# Patient Record
Sex: FEMALE | Race: BLACK OR AFRICAN AMERICAN | Employment: UNEMPLOYED | URBAN - METROPOLITAN AREA
[De-identification: names, ages, dates, MRNs, and addresses within clinical notes are randomized per-mention and may not be internally consistent; named-entity substitution may affect disease eponyms.]

---

## 2017-04-26 LAB
ANTIBODY SCREEN, EXTERNAL: NEGATIVE
CHLAMYDIA, EXTERNAL: NEGATIVE
HBSAG, EXTERNAL: NEGATIVE
HCT, EXTERNAL: 36.4
HGB, EXTERNAL: 12.5
HIV, EXTERNAL: NEGATIVE
N. GONORRHEA, EXTERNAL: NEGATIVE
RUBELLA, EXTERNAL: NORMAL
TYPE, ABO & RH, EXTERNAL: NORMAL

## 2017-10-20 LAB — GRBS, EXTERNAL: NEGATIVE

## 2017-11-24 ENCOUNTER — ANESTHESIA EVENT (OUTPATIENT)
Dept: LABOR AND DELIVERY | Age: 32
End: 2017-11-24
Payer: OTHER GOVERNMENT

## 2017-11-24 ENCOUNTER — ANESTHESIA (OUTPATIENT)
Dept: LABOR AND DELIVERY | Age: 32
End: 2017-11-24
Payer: OTHER GOVERNMENT

## 2017-11-24 ENCOUNTER — HOSPITAL ENCOUNTER (INPATIENT)
Age: 32
LOS: 2 days | Discharge: HOME | End: 2017-11-26
Attending: STUDENT IN AN ORGANIZED HEALTH CARE EDUCATION/TRAINING PROGRAM | Admitting: STUDENT IN AN ORGANIZED HEALTH CARE EDUCATION/TRAINING PROGRAM
Payer: OTHER GOVERNMENT

## 2017-11-24 PROBLEM — Z34.90 PREGNANCY: Status: ACTIVE | Noted: 2017-11-24

## 2017-11-24 LAB
BASOPHILS # BLD: 0 K/UL (ref 0–0.1)
BASOPHILS NFR BLD: 0 % (ref 0–1)
EOSINOPHIL # BLD: 0.1 K/UL (ref 0–0.4)
EOSINOPHIL NFR BLD: 1 % (ref 0–7)
ERYTHROCYTE [DISTWIDTH] IN BLOOD BY AUTOMATED COUNT: 13.3 % (ref 11.5–14.5)
HCT VFR BLD AUTO: 33.7 % (ref 35–47)
HGB BLD-MCNC: 11.7 G/DL (ref 11.5–16)
LYMPHOCYTES # BLD: 2 K/UL (ref 0.8–3.5)
LYMPHOCYTES NFR BLD: 25 % (ref 12–49)
MCH RBC QN AUTO: 31.4 PG (ref 26–34)
MCHC RBC AUTO-ENTMCNC: 34.7 G/DL (ref 30–36.5)
MCV RBC AUTO: 90.3 FL (ref 80–99)
MONOCYTES # BLD: 0.6 K/UL (ref 0–1)
MONOCYTES NFR BLD: 7 % (ref 5–13)
NEUTS SEG # BLD: 5.4 K/UL (ref 1.8–8)
NEUTS SEG NFR BLD: 67 % (ref 32–75)
PLATELET # BLD AUTO: 202 K/UL (ref 150–400)
RBC # BLD AUTO: 3.73 M/UL (ref 3.8–5.2)
WBC # BLD AUTO: 8 K/UL (ref 3.6–11)

## 2017-11-24 PROCEDURE — 75410000003 HC RECOV DEL/VAG/CSECN EA 0.5 HR: Performed by: STUDENT IN AN ORGANIZED HEALTH CARE EDUCATION/TRAINING PROGRAM

## 2017-11-24 PROCEDURE — 10907ZC DRAINAGE OF AMNIOTIC FLUID, THERAPEUTIC FROM PRODUCTS OF CONCEPTION, VIA NATURAL OR ARTIFICIAL OPENING: ICD-10-PCS | Performed by: STUDENT IN AN ORGANIZED HEALTH CARE EDUCATION/TRAINING PROGRAM

## 2017-11-24 PROCEDURE — 75410000000 HC DELIVERY VAGINAL/SINGLE: Performed by: STUDENT IN AN ORGANIZED HEALTH CARE EDUCATION/TRAINING PROGRAM

## 2017-11-24 PROCEDURE — 74011000250 HC RX REV CODE- 250

## 2017-11-24 PROCEDURE — 77030014125 HC TY EPDRL BBMI -B: Performed by: ANESTHESIOLOGY

## 2017-11-24 PROCEDURE — 76060000078 HC EPIDURAL ANESTHESIA: Performed by: ANESTHESIOLOGY

## 2017-11-24 PROCEDURE — 65270000029 HC RM PRIVATE

## 2017-11-24 PROCEDURE — 77030018749 HC HK AMNIO DISP DERY -A

## 2017-11-24 PROCEDURE — 00HU33Z INSERTION OF INFUSION DEVICE INTO SPINAL CANAL, PERCUTANEOUS APPROACH: ICD-10-PCS | Performed by: ANESTHESIOLOGY

## 2017-11-24 PROCEDURE — 3E0P3VZ INTRODUCTION OF HORMONE INTO FEMALE REPRODUCTIVE, PERCUTANEOUS APPROACH: ICD-10-PCS | Performed by: STUDENT IN AN ORGANIZED HEALTH CARE EDUCATION/TRAINING PROGRAM

## 2017-11-24 PROCEDURE — 85025 COMPLETE CBC W/AUTO DIFF WBC: CPT | Performed by: STUDENT IN AN ORGANIZED HEALTH CARE EDUCATION/TRAINING PROGRAM

## 2017-11-24 PROCEDURE — 74011250636 HC RX REV CODE- 250/636: Performed by: STUDENT IN AN ORGANIZED HEALTH CARE EDUCATION/TRAINING PROGRAM

## 2017-11-24 PROCEDURE — 74011250637 HC RX REV CODE- 250/637: Performed by: STUDENT IN AN ORGANIZED HEALTH CARE EDUCATION/TRAINING PROGRAM

## 2017-11-24 PROCEDURE — 77030034850

## 2017-11-24 PROCEDURE — 75410000002 HC LABOR FEE PER 1 HR: Performed by: STUDENT IN AN ORGANIZED HEALTH CARE EDUCATION/TRAINING PROGRAM

## 2017-11-24 PROCEDURE — 36415 COLL VENOUS BLD VENIPUNCTURE: CPT | Performed by: STUDENT IN AN ORGANIZED HEALTH CARE EDUCATION/TRAINING PROGRAM

## 2017-11-24 PROCEDURE — 74011250636 HC RX REV CODE- 250/636: Performed by: ANESTHESIOLOGY

## 2017-11-24 RX ORDER — HYDROCORTISONE ACETATE PRAMOXINE HCL 2.5; 1 G/100G; G/100G
CREAM TOPICAL AS NEEDED
Status: DISCONTINUED | OUTPATIENT
Start: 2017-11-24 | End: 2017-11-26 | Stop reason: HOSPADM

## 2017-11-24 RX ORDER — FENTANYL/BUPIVACAINE/NS/PF 2-1250MCG
1-16 PREFILLED PUMP RESERVOIR EPIDURAL CONTINUOUS
Status: DISCONTINUED | OUTPATIENT
Start: 2017-11-24 | End: 2017-11-24 | Stop reason: HOSPADM

## 2017-11-24 RX ORDER — NALOXONE HYDROCHLORIDE 0.4 MG/ML
0.4 INJECTION, SOLUTION INTRAMUSCULAR; INTRAVENOUS; SUBCUTANEOUS AS NEEDED
Status: DISCONTINUED | OUTPATIENT
Start: 2017-11-24 | End: 2017-11-26 | Stop reason: HOSPADM

## 2017-11-24 RX ORDER — DOCUSATE SODIUM 100 MG/1
100 CAPSULE, LIQUID FILLED ORAL 2 TIMES DAILY
Status: DISCONTINUED | OUTPATIENT
Start: 2017-11-24 | End: 2017-11-26 | Stop reason: HOSPADM

## 2017-11-24 RX ORDER — ACETAMINOPHEN 325 MG/1
650 TABLET ORAL
Status: DISCONTINUED | OUTPATIENT
Start: 2017-11-24 | End: 2017-11-26 | Stop reason: HOSPADM

## 2017-11-24 RX ORDER — OXYTOCIN/RINGER'S LACTATE 20/1000 ML
125-500 PLASTIC BAG, INJECTION (ML) INTRAVENOUS ONCE
Status: ACTIVE | OUTPATIENT
Start: 2017-11-24 | End: 2017-11-25

## 2017-11-24 RX ORDER — DIPHENHYDRAMINE HYDROCHLORIDE 50 MG/ML
12.5 INJECTION, SOLUTION INTRAMUSCULAR; INTRAVENOUS
Status: DISCONTINUED | OUTPATIENT
Start: 2017-11-24 | End: 2017-11-26 | Stop reason: HOSPADM

## 2017-11-24 RX ORDER — OXYTOCIN IN 5 % DEXTROSE 30/500 ML
1-25 PLASTIC BAG, INJECTION (ML) INTRAVENOUS
Status: DISCONTINUED | OUTPATIENT
Start: 2017-11-24 | End: 2017-11-26 | Stop reason: HOSPADM

## 2017-11-24 RX ORDER — ONDANSETRON 2 MG/ML
4 INJECTION INTRAMUSCULAR; INTRAVENOUS
Status: DISCONTINUED | OUTPATIENT
Start: 2017-11-24 | End: 2017-11-26 | Stop reason: HOSPADM

## 2017-11-24 RX ORDER — SODIUM CHLORIDE, SODIUM LACTATE, POTASSIUM CHLORIDE, CALCIUM CHLORIDE 600; 310; 30; 20 MG/100ML; MG/100ML; MG/100ML; MG/100ML
125 INJECTION, SOLUTION INTRAVENOUS CONTINUOUS
Status: DISCONTINUED | OUTPATIENT
Start: 2017-11-24 | End: 2017-11-26 | Stop reason: HOSPADM

## 2017-11-24 RX ORDER — BUPIVACAINE HYDROCHLORIDE 2.5 MG/ML
INJECTION, SOLUTION EPIDURAL; INFILTRATION; INTRACAUDAL AS NEEDED
Status: DISCONTINUED | OUTPATIENT
Start: 2017-11-24 | End: 2017-11-24 | Stop reason: HOSPADM

## 2017-11-24 RX ORDER — OXYCODONE AND ACETAMINOPHEN 5; 325 MG/1; MG/1
1 TABLET ORAL
Status: DISCONTINUED | OUTPATIENT
Start: 2017-11-24 | End: 2017-11-26 | Stop reason: HOSPADM

## 2017-11-24 RX ORDER — SODIUM CHLORIDE 0.9 % (FLUSH) 0.9 %
5-10 SYRINGE (ML) INJECTION AS NEEDED
Status: DISCONTINUED | OUTPATIENT
Start: 2017-11-24 | End: 2017-11-26 | Stop reason: HOSPADM

## 2017-11-24 RX ORDER — NALOXONE HYDROCHLORIDE 0.4 MG/ML
0.4 INJECTION, SOLUTION INTRAMUSCULAR; INTRAVENOUS; SUBCUTANEOUS ONCE
Status: DISCONTINUED | OUTPATIENT
Start: 2017-11-24 | End: 2017-11-24 | Stop reason: HOSPADM

## 2017-11-24 RX ORDER — IBUPROFEN 800 MG/1
800 TABLET ORAL EVERY 8 HOURS
Status: DISCONTINUED | OUTPATIENT
Start: 2017-11-24 | End: 2017-11-26 | Stop reason: HOSPADM

## 2017-11-24 RX ADMIN — SODIUM CHLORIDE, SODIUM LACTATE, POTASSIUM CHLORIDE, AND CALCIUM CHLORIDE 500 ML/HR: 600; 310; 30; 20 INJECTION, SOLUTION INTRAVENOUS at 06:53

## 2017-11-24 RX ADMIN — SODIUM CHLORIDE, SODIUM LACTATE, POTASSIUM CHLORIDE, AND CALCIUM CHLORIDE 125 ML/HR: 600; 310; 30; 20 INJECTION, SOLUTION INTRAVENOUS at 15:01

## 2017-11-24 RX ADMIN — IBUPROFEN 800 MG: 800 TABLET ORAL at 20:16

## 2017-11-24 RX ADMIN — BUPIVACAINE HYDROCHLORIDE 10 ML: 2.5 INJECTION, SOLUTION EPIDURAL; INFILTRATION; INTRACAUDAL at 09:43

## 2017-11-24 RX ADMIN — Medication 1 MILLI-UNITS/MIN: at 07:46

## 2017-11-24 RX ADMIN — SODIUM CHLORIDE, SODIUM LACTATE, POTASSIUM CHLORIDE, AND CALCIUM CHLORIDE 999 ML/HR: 600; 310; 30; 20 INJECTION, SOLUTION INTRAVENOUS at 09:02

## 2017-11-24 RX ADMIN — FENTANYL 0.2 MG/100ML-BUPIV 0.125%-NACL 0.9% EPIDURAL INJ 12 ML/HR: 2/0.125 SOLUTION at 09:55

## 2017-11-24 RX ADMIN — SODIUM CHLORIDE, SODIUM LACTATE, POTASSIUM CHLORIDE, AND CALCIUM CHLORIDE 125 ML/HR: 600; 310; 30; 20 INJECTION, SOLUTION INTRAVENOUS at 09:57

## 2017-11-24 NOTE — DISCHARGE SUMMARY
Patient ID:  Jennifer Dalal  [id number]  49 y.o.  1985    Admit Date: 2017    Discharge Date:      Admitting Physician: Guerline Montaño DO    Attending Physician: Guerline Montaño DO    Admission Diagnoses: Pregnancy, Induction of labor, late term     Procedures for this admission: IOL, TSVD    Discharge Diagnoses: Same as above with TSVD producing a viable infant. Information for the patient's :  Fareed Langston, Female [[id number]]        1st degree, no repair     Discharge Disposition:  Home     Discharge Condition:  Stable     Additional Diagnoses: uncomplicated prenatal course. Maternal Labs:   Lab Results   Component Value Date/Time    HBsAg, External Negative 2017    HIV, External Negative 2017    Rubella, External Immune 2017    GrBStrep, External Negative 10/20/2017       Cord Blood Results:   Information for the patient's :  Fareed Langston, Female [[id number]]   No results found for: PCTABR, ABORH, PCTDIG, BILI, ABORH, ABORHEXT          History of Present Illness:   OB History      Para Term  AB Living    3 1    1    SAB TAB Ectopic Molar Multiple Live Births         1        Admitted for induction of labor. Hospital Course:   Patient was admitted as above and delivered via TSVD . Please the chart for details. The postpartum course was unremarkable. She was deemed stable for discharge home on day 2. Follow-up Care:  Follow up with Dr. Mark Florence in 6wks     Signed:  Guerline Montaño DO  2017  5:25 PM

## 2017-11-24 NOTE — PROGRESS NOTES
Labor Progress Note  Patient seen, fetal heart rate and contraction pattern evaluated at 1400. Resting comfortably with epidural in place.      Physical Exam:  Vitals:   Vitals:    11/24/17 1349 11/24/17 1353 11/24/17 1354 11/24/17 1359   BP:   98/56    Pulse:   93    Resp:  18     Temp:  98.3 °F (36.8 °C)     SpO2: 100%  100% 100%   Weight:       Height:             Cervical Exam was examined   4 cm dilated    90% effaced    -1 station    Presenting Part: cephalic    Uterine Activity[de-identified] Frequency: Every 2-3 minutes  Fetal Heart Rate: Reactive  Baseline: 150 per minute  Variability: moderate  Accelerations: yes  Decelerations: none  Pitocin: 8mu/min     Assessment/Plan:  Ms. Mercedez Bustamante is admitted with pregnancy at 41w0d undergoing IOL for late term     Continue to titrate pitocin   Recheck in 4-6hrs or GUMEN     Yumi Stahl DO  11/24/2017  2:03 PM

## 2017-11-24 NOTE — PROGRESS NOTES
4502: Pt arrives ambulatory to L&D unit accompanied by FOB and child for IOL for post dates with Dr. Lory Cohen. Pt denies HA, visual disturbances, RUQ pain. Admission documentation complete. RN discussed with pt and FOB care of child during labor and delivery. RN asked if there was anyone that could care for child during delivery. Pt and FOB state that they do not have anyone to watch the child. RN expresses that having another staff member watch the child during delivery is not reliable, especially if an emergency were to happen and the pt needed to be taken to the OR. RN told pt and FOB that the child is not permitted in the OR and that it may be a possibility that the FOB would have to stay with the child if that were to happen. Pt and FOB verbalize understanding. Night shift RN passes this information on to the day shift RN. 0700: Bedside and Verbal shift change report given to BEN Moore (oncoming nurse) by PHYLLIS Peterson RN (offgoing nurse). Report included the following information SBAR, Kardex, Intake/Output, MAR, Accordion, Recent Results and Med Rec Status.

## 2017-11-24 NOTE — L&D DELIVERY NOTE
Delivery Note:     Patient reached FD and pushed with good effort to deliver the fetal head in [place]ERT[place] position. No nuchal cord found. The anterior shoulder, followed by the posterior shoulder and the rest of the body then delivered easily. This was a female with Apgars of 9 and 9 at 1 and 5 minutes respectively, weight pending. The infant was bulb suctioned and placed on mom's abdomen. The cord was then double clamped and cut by the FOB. The placenta followed spontaneously, intact, with 3VC. Pitocin was added to the IVF and the fundus was firm to palpation. The vagina, cervix and perineum were examined and a 1st degree perineal laceration was found, it was hemostatic and did not require repair.  mL. No complications. Mom and baby doing well.       Johny Berman, [facility]

## 2017-11-24 NOTE — IP AVS SNAPSHOT
Summary of Care Report The Summary of Care report has been created to help improve care coordination. Users with access to [employer] or [address] (Web-based application) may access additional patient information including the Discharge Summary. If you are not currently a [address] user and need more information, please call the number listed below in the [place] [address] section and ask to be connected with Medical Records. Facility Information Name Address Phone [address] [phone] Patient Information Patient Name Sex  Tyler Gonsalez ([id number]) Female 1985 Discharge Information Admitting Provider Service Area Unit Kaz Weber DO / [phone] [facility] 3 Mother Infant / [phone] Discharge Provider Discharge Date/Time Discharge Disposition Destination (none) 2017 (Pending) AHR (none) Patient Language Language ENGLISH [13] Hospital Problems as of 2017  Never Reviewed Class Noted - Resolved Last Modified POA Active Problems Pregnancy  2017 - Present 2017 by Kaz Weber, DO Unknown Entered by Kaz Weber, DO You are allergic to the following No active allergies Current Discharge Medication List  
  
START taking these medications Dose & Instructions Dispensing Information Comments  
 ibuprofen 800 mg tablet Commonly known as:  MOTRIN Dose:  800 mg Take 1 Tab by mouth every eight (8) hours as needed for Pain. Quantity:  90 Tab Refills:  0  
   
 oxyCODONE-acetaminophen 5-325 mg per tablet Commonly known as:  PERCOCET Dose:  1 Tab Take 1 Tab by mouth every six (6) hours as needed. Max Daily Amount: 4 Tabs. Quantity:  5 Tab Refills:  0 CONTINUE these medications which have NOT CHANGED Dose & Instructions Dispensing Information Comments PNV No12-Iron-FA-DSS-OM-3 29 mg iron-1 mg -50 mg Cpkd Take  by mouth. Refills:  0 SLOW RELEASE IRON 160 mg (50 mg iron) Tber tablet Generic drug:  ferrous sulfate ER Dose:  1 Tab Take 1 Tab by mouth daily. Refills:  0 Surgery Information ID Date/Time Status Primary Surgeon All Procedures Location [id number] 11/24/2017 Complete   [facility] - DO NOT SCHEDULE Follow-up Information Follow up With Details Comments Contact Info None   None (395) Patient stated that they have no PCP Discharge Instructions Discharge Instructions for Vaginal Delivery Patient ID: 
Abby Gauthier [id number] 
1 y.o. 
1985 Take Home Medications Continue taking your prenatal vitamins if you are breastfeeding. Follow-up care is a key part of your treatment and safety. Please schedule and keep appointments. Follow-up with your primary OB in 6 weeks. Activity Avoid anything in your vagina for 6 weeks (no intercourse, tampons, or douching). You may drive unless you are taking prescription pain medications. Climbing stairs and light lifting are okay. Please avoid excessive exercise, though walking is okay- you'll be tired! Diet Regular diet as tolerated. Be sure to drink plenty of fluids if you are breastfeeding. Wound care If you have stitches, continue to rinse with a squirt bottle of warm water each time you void for about 7-10 days. .  Your stitches will gradually dissolve over four to eight weeks. Sitz baths are also helpful to keep the wound clean, encourage healing, and to help with pain associated with the stitches or hemorrhoids. You can use either a sitz bath basin or a bathtub filled with 2-3\" inches of plain warm water. Soak for 10 minutes 3 times a day as tolerated. Pain Management 1. Over the counter medications such as Tylenol and ibuprofen (Motrin or Advil) are ideal.  These may be taken together, alternating doses. You may  take the maximum dose:  Motrin or Advil (generic ibuprofen), either 3 tablets every 6 hours or 4 tablets every 8 hours or Tylenol (acetominophen) 1000mg every 6 hours (equivalent to 2 extra strength Tylenol). 2. You may also have a precrescription for stronger pain medication. Take only as needed and transition to over the counter medication in the next few days. Minimize amounts of the prescription medication, as it can be habit-forming and will worsen or cause constipation. Most patients will find that within a couple of days, their pain is adequately controlled using only over-the-counter medications. 3. The prescription pain medication is mixed with Tylenol, therefore, you should not take any extra Tylenol or acetaminophen until you have reduced your prescription pain medication. 4. Add heating pad or sitz baths as needed. Add hemorrhoid wipes or ointments if needed Constipation 1. Constipation is normal after pregnancy and delivery, especially while taking prescription narcotic pain medication. 2. Over the counter remedies including ducosate (Colace), take 1-2 capsules 1-2 times daily for soft stool as needed. You may also add/ try milk of magnesia or rectal remedies such as Dulcolax or Fleets enema. Recovery: What to Expect at [facility] 1. Fatigue is expected. Try to rest when you can and don't worry about doing housework or other tasks which can wait. 2. The soreness along your bottom will improve significantly over the first 2 weeks, but it may take 6 weeks before you are completely recovered. 3. Back pain or general body aches or muscle soreness are expected and should improve with acetominophen or ibuprofen. 4. Leg swelling due to pregnancy and/or IV fluids given in the hospital will take about two weeks to resolve. 5. Most women experience some form of the \"Baby Blues\" after having a baby. Feeling emotional, tearful, frustrated, anxious, sad, and irritable some of the time is normal and go away after about 2 weeks. Adequate rest and help from your family will help. Take breaks from caring for the baby. Call your doctor if your symptoms seem severe, last more than 2 weeks, or seem to be getting worse instead of better. Get help immediately if you have thoughts of wanting to hurt yourself or others! Call your doctor or seek immediate medical care if you have: 
Heavy vaginal bleeding, soaking through one or more pads an hour for several hours. Foul-smelling discharge from your vagina or incision. Consistent nausea and vomiting and cannot keep fluids down. Consistent pain that does not get better after you take pain medicine. Sudden chest pain and shortness of breath Signs of a blood clot: pain/ swelling/ increasing redness in your lower extremeties Signs of infection: increased pain in your abdomen or vaginal area; red streaks, warmth, or tenderness of your breasts; fever of 100.5 F or greater Chart Review Routing History No Routing History on File

## 2017-11-24 NOTE — H&P
History & Physical    Name: Santana Florence MRN: [id number]  SSN: [id number]    YOB: 1985  Age: 34 y.o. Sex: female        Subjective:     Estimated Date of Delivery: 17  OB History      Para Term  AB Living    3 1    1    SAB TAB Ectopic Molar Multiple Live Births         1          Ms. Inés Johnson is admitted with pregnancy at 41w0d for induction of labor. Prenatal course was normal. Please see prenatal records for details. Rosalino irregularly last few days. Past Medical History:   Diagnosis Date    Sickle cell trait syndrome ([facility] [address]      History reviewed. No pertinent surgical history. Social History     Occupational History    Not on file. Social History Main Topics    Smoking status: Never Smoker    Smokeless tobacco: Never Used    Alcohol use No    Drug use: No    Sexual activity: Yes     Partners: Male     Birth control/ protection: None     History reviewed. No pertinent family history. No Known Allergies  Prior to Admission medications    Medication Sig Start Date End Date Taking? Authorizing Provider   PNV No12-Iron-FA-DSS-OM-3 29 mg iron-1 mg -50 mg CPKD Take  by mouth. Yes Historical Provider   ferrous sulfate ER (SLOW RELEASE IRON) 160 mg (50 mg iron) TbER tablet Take 1 Tab by mouth daily. Yes Historical Provider        Review of Systems: A comprehensive review of systems was negative except for that written in the HPI. Objective:     Vitals:  Vitals:    17 0704 17 0708 17 0709 17 0714   BP:  111/66     Pulse:  83     Resp:  16     Temp:  98.1 °F (36.7 °C)     SpO2: 100%  100% 100%   Weight:       Height:            Physical Exam:  Patient without distress.   Heart: Regular rate and rhythm  Lung: normal respiratory effort  Abdomen: soft, nontender  Fundus: soft and non tender  Perineum: blood absent, amniotic fluid absent  Cervical Exam: 3 cm dilated    50% effaced    -2 station    Presenting Part: cephalic  Lower Extremities:  - Edema No  Membranes:  Artificial Rupture of Membranes; Amniotic Fluid: small amount of clear fluid  Fetal Heart Rate: Reactive  Baseline: 145 per minute  Variability: moderate  Accelerations: yes  Decelerations: none  Uterine contractions: irregular, every 6-8 minutes    Prenatal Labs:   Lab Results   Component Value Date/Time    Rubella, External Immune 2017    GrBStrep, External Negative 10/20/2017    HBsAg, External Negative 2017    HIV, External Negative 2017    Gonorrhea, External Negative 2017    Chlamydia, External Negative 2017        Assessment/Plan:   31yo  at 41w0d her for IOL for late term     Plan: Admit for IOL for late term. Group B Strep was negative.   Consents reviewed and signed  Pitocin + AROM   Epidural PRN   Anticipate vaginal delivery     Signed By:  Aung Fabian DO     2017

## 2017-11-24 NOTE — IP AVS SNAPSHOT
[address] 
[phone] Patient: Gregg Mustafa MRN: [id number] :1985 My Medications TAKE these medications as instructed Instructions Each Dose to Equal  
 Morning Noon Evening Bedtime  
 ibuprofen 800 mg tablet Commonly known as:  MOTRIN Your last dose was: Your next dose is: Take 1 Tab by mouth every eight (8) hours as needed for Pain. 800 mg  
    
   
   
   
  
 oxyCODONE-acetaminophen 5-325 mg per tablet Commonly known as:  PERCOCET Your last dose was: Your next dose is: Take 1 Tab by mouth every six (6) hours as needed. Max Daily Amount: 4 Tabs. 1 Tab PNV No12-Iron-FA-DSS-OM-3 29 mg iron-1 mg -50 mg Cpkd Your last dose was: Your next dose is: Take  by mouth. SLOW RELEASE IRON 160 mg (50 mg iron) Tber tablet Generic drug:  ferrous sulfate ER Your last dose was: Your next dose is: Take 1 Tab by mouth daily. 1 Tab Where to Get Your Medications Information on where to get these meds will be given to you by the nurse or doctor. ! Ask your nurse or doctor about these medications  
  ibuprofen 800 mg tablet  
 oxyCODONE-acetaminophen 5-325 mg per tablet

## 2017-11-24 NOTE — IP AVS SNAPSHOT
Chiki Becker 
 
 
 [address] [place], [place] [id number] Patient: Kimberly Ornelas MRN: [id number] :1985 About your hospitalization You were admitted on:  2017 You last received care in the:  [facility] 3 [facility] You were discharged on:  2017 Why you were hospitalized Your primary diagnosis was:  Not on File Your diagnoses also included:  Pregnancy Things You Need To Do (next 8 weeks) Follow up with None Where:  None (395) Patient stated that they have no PCP Discharge Orders None A check anthony indicates which time of day the medication should be taken. My Medications TAKE these medications as instructed Instructions Each Dose to Equal  
 Morning Noon Evening Bedtime  
 ibuprofen 800 mg tablet Commonly known as:  MOTRIN Your last dose was: Your next dose is: Take 1 Tab by mouth every eight (8) hours as needed for Pain. 800 mg  
    
   
   
   
  
 oxyCODONE-acetaminophen 5-325 mg per tablet Commonly known as:  PERCOCET Your last dose was: Your next dose is: Take 1 Tab by mouth every six (6) hours as needed. Max Daily Amount: 4 Tabs. 1 Tab PNV No12-Iron-FA-DSS-OM-3 29 mg iron-1 mg -50 mg Cpkd Your last dose was: Your next dose is: Take  by mouth. SLOW RELEASE IRON 160 mg (50 mg iron) Tber tablet Generic drug:  ferrous sulfate ER Your last dose was: Your next dose is: Take 1 Tab by mouth daily. 1 Tab Where to Get Your Medications Information on where to get these meds will be given to you by the nurse or doctor. ! Ask your nurse or doctor about these medications  
  ibuprofen 800 mg tablet  
 oxyCODONE-acetaminophen 5-325 mg per tablet Discharge Instructions Discharge Instructions for Vaginal Delivery Patient ID: 
Jakub Schulte [id number] 
15 y.o. 
1985 Take Home Medications Continue taking your prenatal vitamins if you are breastfeeding. Follow-up care is a key part of your treatment and safety. Please schedule and keep appointments. Follow-up with your primary OB in 6 weeks. Activity Avoid anything in your vagina for 6 weeks (no intercourse, tampons, or douching). You may drive unless you are taking prescription pain medications. Climbing stairs and light lifting are okay. Please avoid excessive exercise, though walking is okay- you'll be tired! Diet Regular diet as tolerated. Be sure to drink plenty of fluids if you are breastfeeding. Wound care If you have stitches, continue to rinse with a squirt bottle of warm water each time you void for about 7-10 days. .  Your stitches will gradually dissolve over four to eight weeks. Sitz baths are also helpful to keep the wound clean, encourage healing, and to help with pain associated with the stitches or hemorrhoids. You can use either a sitz bath basin or a bathtub filled with 2-3\" inches of plain warm water. Soak for 10 minutes 3 times a day as tolerated. Pain Management 1. Over the counter medications such as Tylenol and ibuprofen (Motrin or Advil) are ideal.  These may be taken together, alternating doses. You may  take the maximum dose:  Motrin or Advil (generic ibuprofen), either 3 tablets every 6 hours or 4 tablets every 8 hours or Tylenol (acetominophen) 1000mg every 6 hours (equivalent to 2 extra strength Tylenol). 2. You may also have a precrescription for stronger pain medication. Take only as needed and transition to over the counter medication in the next few days. Minimize amounts of the prescription medication, as it can be habit-forming and will worsen or cause constipation.  Most patients will find that within a couple of days, their pain is adequately controlled using only over-the-counter medications. 3. The prescription pain medication is mixed with Tylenol, therefore, you should not take any extra Tylenol or acetaminophen until you have reduced your prescription pain medication. 4. Add heating pad or sitz baths as needed. Add hemorrhoid wipes or ointments if needed Constipation 1. Constipation is normal after pregnancy and delivery, especially while taking prescription narcotic pain medication. 2. Over the counter remedies including ducosate (Colace), take 1-2 capsules 1-2 times daily for soft stool as needed. You may also add/ try milk of magnesia or rectal remedies such as Dulcolax or Fleets enema. Recovery: What to Expect at [facility] 1. Fatigue is expected. Try to rest when you can and don't worry about doing housework or other tasks which can wait. 2. The soreness along your bottom will improve significantly over the first 2 weeks, but it may take 6 weeks before you are completely recovered. 3. Back pain or general body aches or muscle soreness are expected and should improve with acetominophen or ibuprofen. 4. Leg swelling due to pregnancy and/or IV fluids given in the hospital will take about two weeks to resolve. 5. Most women experience some form of the \"Baby Blues\" after having a baby. Feeling emotional, tearful, frustrated, anxious, sad, and irritable some of the time is normal and go away after about 2 weeks. Adequate rest and help from your family will help. Take breaks from caring for the baby. Call your doctor if your symptoms seem severe, last more than 2 weeks, or seem to be getting worse instead of better. Get help immediately if you have thoughts of wanting to hurt yourself or others! Call your doctor or seek immediate medical care if you have: 
Heavy vaginal bleeding, soaking through one or more pads an hour for several hours. Foul-smelling discharge from your vagina or incision. Consistent nausea and vomiting and cannot keep fluids down. Consistent pain that does not get better after you take pain medicine. Sudden chest pain and shortness of breath Signs of a blood clot: pain/ swelling/ increasing redness in your lower extremeties Signs of infection: increased pain in your abdomen or vaginal area; red streaks, warmth, or tenderness of your breasts; fever of 100.5 F or greater [facility] Announcement We are excited to announce that we are making your provider's discharge notes available to you in [facility]. You will see these notes when they are completed and signed by the physician that discharged you from your recent hospital stay. If you have any questions or concerns about any information you see in [facility], please call the Health Information Department where you were seen or reach out to your Primary Care Provider for more information about your plan of care. Introducing [facility] HE[facility]! Priya Carnes introduces [facility] patient portal. Now you can access parts of your medical record, email your doctor's office, and request medication refills online. 1. In your internet browser, go to [email or web address] 2. Click on the First Time User? Click Here link in the Sign In box. You will see the New Member Sign Up page. 3. Enter your [facility] Access Code exactly as it appears below. You will not need to use this code after youve completed the sign-up process. If you do not sign up before the expiration date, you must request a new code. · [facility] Access Code: [email or web address] Expires: 2/24/2018  8:44 AM 
 
4. Enter the last four digits of your Social Security Number (xxxx) and Date of Birth (mm/dd/yyyy) as indicated and click Submit. You will be taken to the next sign-up page. 5. Create a [facility] ID.  This will be your [facility] login ID and cannot be changed, so think of one that is secure and easy to remember. 6. Create a [employer] password. You can change your password at any time. 7. Enter your Password Reset Question and Answer. This can be used at a later time if you forget your password. 8. Enter your e-mail address. You will receive e-mail notification when new information is available in [address] 9. Click Sign Up. You can now view and download portions of your medical record. 10. Click the Download Summary menu link to download a portable copy of your medical information. If you have questions, please visit the Frequently Asked Questions section of the [employer] website. Remember, [employer] is NOT to be used for urgent needs. For medical emergencies, dial 911. Now available from your iPhone and Android! Providers Seen During Your Hospitalization Provider Specialty Primary office phone Kamala Dumont DO Obstetrics & Gynecology [phone] Your Primary Care Physician (PCP) Primary Care Physician Office Phone Office Fax NONE ** None ** ** None ** You are allergic to the following No active allergies Recent Documentation Height Weight Breastfeeding? BMI OB Status Smoking Status 1.727 m 100.7 kg Unknown 33.75 kg/m2 Recent pregnancy Never Smoker Emergency Contacts Name Discharge Info Relation Home Work Mobile [facility] President DISCHARGE CAREGIVER [3] Spouse [3] [phone] Patient Belongings The following personal items are in your possession at time of discharge: 
  Dental Appliances: None  Visual Aid: None      Home Medications: None      Clothing: At bedside    Other Valuables: Meir Chaney [employer], [employer] Please provide this summary of care documentation to your next provider. Signatures-by signing, you are acknowledging that this After Visit Summary has been reviewed with you and you have received a copy. Patient Signature:  ____________________________________________________________ Date:  ____________________________________________________________  
  
[place] Provider Signature:  ____________________________________________________________ Date:  ____________________________________________________________

## 2017-11-24 NOTE — PROGRESS NOTES
17 at 0705: Verbal bedside report received from PHYLLIS Barajas RN. Patient care assumed at this time of 34 year of , [facility] 17. Patient of Dr. Yarely Botello. Introduction made. Patient admitted this am for scheduled IOL secondary to post dates. Patient denies any known drug allergies, known complications with pregnancy, known LOF or VB. Patient does report feeling positive fetal movement this am and occasional contractions. GBS negative. Hx of previous vaginal delivery x 1.    0717: Variable noted with contraction. At bedside with patient. Patient repositioned from semi-fowlers position to left lateral position. LR bolus infusing w/o difficulty. EFM ultrasound adjusted. Patient tolerated repositioning well. 0740: Dr. Yarely Botello on the unit at this time. Dr. Yarely Botello notified of two decelerations in fht since admission. Fetal tracing reviewed by Dr. Yarely Botello. RBVO received to proceed with starting pitocin at this time. 8570: Teaching done on pitocin with patient. Patient verbalizes good understanding and agreement with plan of care. Oxytocin 30 units in 500 ml of D5W started at 1 ml/hr at this time per MD order and hospital policy. 2545: Patient states she is starting to feel contractions more, would like epidural. LR rate increased to 999 ml/hr for epidural pre-load. 9236: Dr. Yarely Botello into room to see patient. Patient's VS and fetal tracing reviewed by Dr. Yarely Botello. SVE performed and amniotomy completed by Dr. Yarely Botello. Patient 2-3/50/-1 per MD exam. Small amount of fluid noted with AROM, unable to determine color of fluid. Dr. Yarely Botello unable to determine if meconium present or not. Rebecca-care done, chux pad changed. Will continue to assess. Plan of care discussed with patient and her S/O by Dr. Yarely Botello. Questions answered by MD. No new orders received. Epidural prn and continue to titrate pitocin to adequate labor. Patient verbalizes good understanding, tolerated SVE and amniotomy well.     8024: Dr. Jada Middleton called for epidural orders on patient. 3129: Patient has received one liter LR bolus. Dr. Jada Middleton called for epidural placement, states he will be into see patient. Patient made aware. 2938: Dr. Jada Middleton into room for epidural placement. Fall precautions in place. 5945: Patient assisted to HF position on side of bed.    7721: Time out done. 0945: Epidural completed by MD. Patient lied down in bed in supine position with wedge under right hip. Fall precautions in place. EFM x 2 being adjusted. LR infusing at 999 ml/hr. S/S of epidural discussed. Patient denies S/S at this time. Patient instructed to report if they occur, verbalizes good understanding. 1010: Patient comfortable with epidural. Moderate amount of green tinged fluid noted on chux pad. Rebecca-care done, chux pad changed. Campbell catheter placed using sterile technique per MD order. Clear yellow urine noted in tubing and bag. Campbell and rebecca-care done, chux pad changed. Patient tolerated well. 1013: Patient repositioned to left lateral position. Peanut ball placed in between BLE. Patient tolerated well.    1021: Charge nurse VENUS SANCHEZ and nursery staff FAROOQ Muñoz notified of meconium fluid. 1105: Dysfunctional uterine contraction pattern noted. Pitocin rate decreased from 8 ml/hr to 4 ml/hr. Will continue to monitor. 1129: Patient repositioned from left lateral to right lateral position. Position supported with pillows. Peanut ball placed back in between BLE. Chux pads dry at this time. Patient tolerated well. Will continue to monitor. 1210: Verbal report given to SEJAL Rea RN. Patient care turned over at this time for lunch relief. 1240: Patient care reassumed at this time. 1306: Patient may have clears per Md order. Patient repositioned from right lateral to semi/high fowlers position to have sips of clears per MD order. Peanut ball removed. EFM x 2 adjusted.  Patient tolerated well.    1325: Dr. Darryl Prescott called to check on patient. Dr. Bassem Umaña notified, confirmed meconium fluid with pad change. Patient remains afebrile and comfortable with epidural. Contractions currently every 2-3 minutes. Cat I tracing at this time. Per Dr. Bassem Umaña, she will be over to see patient again soon. No new orders received, will continue to monitor. 1336: Patient finished sips of clears, tolerated without apparent difficulty. Patient repositioned to left lateral position. Campbell and rebecca-care done. Small amount of meconium fluid noted on chux. Chux pad changed. Peanut shaped birthing ball replaced between BLE. Patient tolerated well.    1349: Patient repositioned back to semi/high fowlers position due to fetal intolerance in left lateral position. Peanut ball removed. EFM x 2 adjusted. Patient tolerated well. 1357: Dr. Bassem Umaña into room to see patient at this time. Patient's VS and fetal tracing reviewed by MD. Dr. Bassem Umaña notified of late decelerations in left lateral position. SVE performed by Dr. Bassem Umaña. Patient 4/90/-1 per MD exam. No new orders received. Will continue to monitor. 1400: Patient repositioned to right lateral position. Rebecca-care done, chux pad changed. Peanut ball placed back in between BLE. Patient tolerated well. 1417: Dr. Bassem Umaña remains on the unit, notified of late declarations with patient on her right side. Fetal tracing reviewed by Dr. Bassem Umaña. Patient repositioned from right lateral to semi-high fowlers position. Peanut ball removed. Patient tolerated well. Moderate variability remains. Will continue to monitor. 1433: Dr. Bassem Umaña remains on the unit. Patient wearing O2 at 10 l/min via non-rebreather mask. LR rate increased to 999 ml/hr for IVF bolus. 1558: Dr. Bassem Umaña on the unit, fetal tracing reviewed by MD at this time. Contractions doubling. Pitocin increased from 4 ml/hr to 6 ml/hr. Will continue to monitor. 1631: Declarations noted in fht at this time.  Patient repositioned to right lateral position. Position supported with pillow. Will try position without peanut ball to see if baby tolerates it. 1656: Patient rang out, reports the urge to push. Dr. Lory Cohen remains on the unit, will notify MD.    Joel Barbosa: Dr. Lory Cohen into room at this time. SVE performed by MD. Patient 9 cm per MD exam.    1700: Campbell catheter removed at this time per MD order without difficulty. 1704: SVE performed by Dr. Lory Cohen. Patient complete with the urge to push. 1705: Patient started pushing with contraction under Dr. Erick Barnes instruction. 1709: Vaginal delivery of live, viable female infant by Dr. Lory Cohen. Both head and body delivered at 1709. Hob lowered with delivery of baby's head, legs back in [address]. Baby placed skin to skin on patient. Baby nurse in attendance at delivery, assumed care of  at this time. 1713: Spontaneous delivery of placenta by Dr. Lory Cohen. Initial fundal by MD. Pitocin rate increased to 999 ml/hr. 1717: Dr. Lory Cohen finished with delivery, counts correct. FF @ U-1.    1722: Recovery started. Ice pack pad applied to perineum post juventino-care. Patient tolerated well.    1723: Baby showing signs of feeding cues. Baby placed to breast, proper latch noted. 1822: Teaching done on ordering food. Patient given menu at this time. 1850: Verbal bedside shift change report given to Wili Naik RN(oncoming nurse) by [facility] (offgoing nurse). Report included the following information SBAR, Kardex, Procedure Summary, Intake/Output, MAR, Accordion, Recent Results and Med Rec Status. Patient breast-feeding , positive interaction noted. Patient denies pain or discomfort. Dinner tray at bedside for patient to eat once finished nursing. Patient denies needs, concerns or questions. Patient care turned over at this time.

## 2017-11-24 NOTE — ANESTHESIA PROCEDURE NOTES
Epidural Block    Start time: 11/24/2017 9:38 AM  End time: 11/24/2017 9:48 AM  Performed by: Hardy Flores by: Sophie Skelton     Pre-Procedure  Indication: labor epidural    Preanesthetic Checklist: patient identified, risks and benefits discussed, anesthesia consent, timeout performed and anesthesia consent      Epidural:   Patient position:  Seated  Prep region:  Lumbar  Prep: Betadine    Location:  L3-4    Needle and Epidural Catheter:   Needle Type:  Tuohy  Needle Gauge:  17 G  Injection Technique:  Loss of resistance using air  Attempts:  1  Catheter Size:  18 G  Events: no blood with aspiration, no cerebrospinal fluid with aspiration, no paresthesia and negative aspiration test    Test Dose:  Lidocaine 1.5% w/ epi and negative    Assessment:   Catheter Secured:  Tegaderm and tape  Insertion:  Uncomplicated  Patient tolerance:  Patient tolerated the procedure well with no immediate complications

## 2017-11-25 PROCEDURE — 65270000029 HC RM PRIVATE

## 2017-11-25 PROCEDURE — 74011250637 HC RX REV CODE- 250/637: Performed by: STUDENT IN AN ORGANIZED HEALTH CARE EDUCATION/TRAINING PROGRAM

## 2017-11-25 RX ADMIN — DOCUSATE SODIUM 100 MG: 100 CAPSULE, LIQUID FILLED ORAL at 07:42

## 2017-11-25 RX ADMIN — OXYCODONE HYDROCHLORIDE AND ACETAMINOPHEN 1 TABLET: 5; 325 TABLET ORAL at 07:42

## 2017-11-25 RX ADMIN — OXYCODONE HYDROCHLORIDE AND ACETAMINOPHEN 1 TABLET: 5; 325 TABLET ORAL at 19:24

## 2017-11-25 RX ADMIN — IBUPROFEN 800 MG: 800 TABLET ORAL at 03:38

## 2017-11-25 RX ADMIN — IBUPROFEN 800 MG: 800 TABLET ORAL at 15:21

## 2017-11-25 RX ADMIN — OXYCODONE HYDROCHLORIDE AND ACETAMINOPHEN 1 TABLET: 5; 325 TABLET ORAL at 02:01

## 2017-11-25 NOTE — ROUTINE PROCESS
Bedside and Verbal shift change report given to Candy Calvillo RN (oncoming nurse) by Porsha Borges RN (offgoing nurse). Report included the following information SBAR, Kardex, Intake/Output and MAR.

## 2017-11-25 NOTE — PROGRESS NOTES
Post-Partum Day Number 1 Progress Note    Canary Grooms       Information for the patient's newbornTristian Sánchez Female [[id number]]   Vaginal, Spontaneous Delivery   Patient doing well without significant complaint. Voiding without difficulty, normal lochia. Tolerating diet without nausea or vomiting. Pain controlled with oral medications. Vitals:  Visit Vitals    BP 98/66 (BP 1 Location: Left arm, BP Patient Position: At rest)    Pulse 74    Temp 97.5 °F (36.4 °C)    Resp 15    Ht 5' 8\" (1.727 m)    Wt 100.7 kg (222 lb)    SpO2 100%    Breastfeeding Unknown    BMI 33.75 kg/m2     Temp (24hrs), Av.3 °F (36.8 °C), Min:97.5 °F (36.4 °C), Max:99.6 °F (37.6 °C)        Exam:   Patient without distress. FF @ U-2 NT                LE NT w/o edema    Labs:     Lab Results   Component Value Date/Time    WBC 8.0 2017 06:46 AM    HGB 11.7 2017 06:46 AM    HCT 33.7 2017 06:46 AM    PLATELET 175  06:46 AM    Hgb, External 12.5 2017    Hct, External 36.4 2017     Lab Results   Component Value Date/Time    Rubella, External Immune 2017    GrBStrep, External Negative 10/20/2017    HBsAg, External Negative 2017    HIV, External Negative 2017    Gonorrhea, External Negative 2017    Chlamydia, External Negative 2017         Information for the patient's :  Stephanie Francisco, Female [[id number]]   One Minute Apgar: 5 (Filed from Delivery Summary)  Five  Minute Apgar: 9 (Filed from Delivery Summary)    Assessment:   PPD 1 s/p , Doing well and stable  Plan:  1.  Continue routine postpartum care      Julia Peguero DO  2017  8:33 AM

## 2017-11-25 NOTE — ROUTINE PROCESS
SBAR IN Report: Mother    Verbal report received from Ronaldo Barba RN (full name & credentials) on this patient, who is now being transferred from L&D (unit) for routine progression of care. Report consisted of patient's Situation, Background, Assessment and Recommendations (SBAR).  ID bands were compared with the identification form, and verified with the patient and transferring nurse. Information from the SBAR, Kardex, Intake/Output and MAR and the [place] Report was reviewed with the transferring nurse; opportunity for questions and clarification provided.

## 2017-11-25 NOTE — LACTATION NOTE
This note was copied from a baby's chart. Discussed with mother her plan for feeding. Reviewed the benefits of exclusive breast milk feeding during the hospital stay. Informed her of the risks of using formula to supplement in the first few days of life as well as the benefits of successful breast milk feeding; referred her to the Breastfeeding booklet about this information. She acknowledges understanding of information reviewed and states that it is her plan to BF her infant. Will support her choice and offer additional information as needed. Pt will successfully establish breastfeeding by feeding in response to early feeding cues   or wake every 3h, will obtain deep latch, and will keep log of feedings/output. Taught to BF at hunger cues and or q 2-3 hrs and to offer 10-20 drops of hand expressed colostrum at any non-feeds.       Breast Assessment  Left Breast: Large  Left Nipple: Everted, Intact  Right Breast: Large  Right Nipple: Everted, Intact  Breast- Feeding Assessment  Attends Breast-Feeding Classes: No (Experienced BF mom, BF basics reviewed, BF 1st child x 1.5 yrs)  Breast-Feeding Experience: Yes  Breast Trauma/Surgery: No  Type/Quality: Good  Lactation Consultant Visits  Breast-Feedings: Good   Mother/Infant Observation  Mother Observation: Breast comfortable  Infant Observation: Feeding cues, Opens mouth  LATCH Documentation  Latch: Grasps breast, tongue down, lips flanged, rhythmic sucking  Audible Swallowing: Spontaneous and intermittent (24 hours old)  Type of Nipple: Everted (after stimulation)  Comfort (Breast/Nipple): Filling, red/small blisters/bruises, mild/mod discomfort  Hold (Positioning): No assist from staff, mother able to position/hold infant  LATCH Score: 9

## 2017-11-26 VITALS
HEIGHT: 68 IN | HEART RATE: 81 BPM | DIASTOLIC BLOOD PRESSURE: 62 MMHG | WEIGHT: 222 LBS | RESPIRATION RATE: 15 BRPM | OXYGEN SATURATION: 100 % | SYSTOLIC BLOOD PRESSURE: 94 MMHG | TEMPERATURE: 97.7 F | BODY MASS INDEX: 33.65 KG/M2

## 2017-11-26 RX ORDER — OXYCODONE AND ACETAMINOPHEN 5; 325 MG/1; MG/1
1 TABLET ORAL
Qty: 5 TAB | Refills: 0 | Status: SHIPPED | OUTPATIENT
Start: 2017-11-26

## 2017-11-26 RX ORDER — IBUPROFEN 800 MG/1
800 TABLET ORAL
Qty: 90 TAB | Refills: 0 | Status: SHIPPED | OUTPATIENT
Start: 2017-11-26

## 2017-11-26 NOTE — PROGRESS NOTES
Bedside shift change report given to Keith Shepherd (oncoming nurse) by Beck (offgoing nurse). Report included the following information SBAR and MAR.

## 2017-11-26 NOTE — PROGRESS NOTES
Post-Partum Day Number 2 Progress Note    Jesus Newton       Information for the patient's newbornRenee Law, Female [[id number]]   Vaginal, Spontaneous Delivery   Patient doing well without significant complaint. Voiding without difficulty, normal lochia. Tolerating diet without nausea or vomiting. Pain controlled with oral medications. Vitals:  Visit Vitals    BP 94/62 (BP 1 Location: Left arm, BP Patient Position: At rest)    Pulse 81    Temp 97.7 °F (36.5 °C)    Resp 15    Ht 5' 8\" (1.727 m)    Wt 100.7 kg (222 lb)    SpO2 100%    Breastfeeding Unknown    BMI 33.75 kg/m2     Temp (24hrs), Av.6 °F (36.4 °C), Min:97.6 °F (36.4 °C), Max:97.7 °F (36.5 °C)        Exam:   Patient without distress. FF @ U-2 NT                LE NT w/o edema    Labs:     Lab Results   Component Value Date/Time    WBC 8.0 2017 06:46 AM    HGB 11.7 2017 06:46 AM    HCT 33.7 2017 06:46 AM    PLATELET 031  06:46 AM    Hgb, External 12.5 2017    Hct, External 36.4 2017     Lab Results   Component Value Date/Time    Rubella, External Immune 2017    GrBStrep, External Negative 10/20/2017    HBsAg, External Negative 2017    HIV, External Negative 2017    Gonorrhea, External Negative 2017    Chlamydia, External Negative 2017         Information for the patient's :  Krishna Salas, Female [[id number]]   One Minute Apgar: 5 (Filed from Delivery Summary)  Five  Minute Apgar: 9 (Filed from Delivery Summary)        Assessment:   PPD 2  s/p , Doing well and stable  Plan:  1. Continue routine postpartum care  2. Discharge home today.    3. Medical complications: none       Tiny Brandon DO  2017  7:17 AM

## 2017-11-26 NOTE — DISCHARGE INSTRUCTIONS
Discharge Instructions for Vaginal Delivery    Patient ID:  Lauri Wade  [id number]  34 y.o.  1985    Take Home Medications       Continue taking your prenatal vitamins if you are breastfeeding. Follow-up care is a key part of your treatment and safety. Please schedule and keep appointments. Follow-up with your primary OB in 6 weeks. Activity  Avoid anything in your vagina for 6 weeks (no intercourse, tampons, or douching). You may drive unless you are taking prescription pain medications. Climbing stairs and light lifting are okay. Please avoid excessive exercise, though walking is okay- you'll be tired! Diet  Regular diet as tolerated. Be sure to drink plenty of fluids if you are breastfeeding. Wound care  If you have stitches, continue to rinse with a squirt bottle of warm water each time you void for about 7-10 days. .  Your stitches will gradually dissolve over four to eight weeks. Sitz baths are also helpful to keep the wound clean, encourage healing, and to help with pain associated with the stitches or hemorrhoids. You can use either a sitz bath basin or a bathtub filled with 2-3\" inches of plain warm water. Soak for 10 minutes 3 times a day as tolerated. Pain Management  1. Over the counter medications such as Tylenol and ibuprofen (Motrin or Advil) are ideal.  These may be taken together, alternating doses. You may  take the maximum dose:  Motrin or Advil (generic ibuprofen), either 3 tablets every 6 hours or 4 tablets every 8 hours or Tylenol (acetominophen) 1000mg every 6 hours (equivalent to 2 extra strength Tylenol). 2. You may also have a precrescription for stronger pain medication. Take only as needed and transition to over the counter medication in the next few days. Minimize amounts of the prescription medication, as it can be habit-forming and will worsen or cause constipation.  Most patients will find that within a couple of days, their pain is adequately controlled using only over-the-counter medications. 3. The prescription pain medication is mixed with Tylenol, therefore, you should not take any extra Tylenol or acetaminophen until you have reduced your prescription pain medication. 4. Add heating pad or sitz baths as needed. Add hemorrhoid wipes or ointments if needed    Constipation  1. Constipation is normal after pregnancy and delivery, especially while taking prescription narcotic pain medication. 2. Over the counter remedies including ducosate (Colace), take 1-2 capsules 1-2 times daily for soft stool as needed. You may also add/ try milk of magnesia or rectal remedies such as Dulcolax or Fleets enema. Recovery: What to Expect at Home  1. Fatigue is expected. Try to rest when you can and don't worry about doing housework or other tasks which can wait. 2. The soreness along your bottom will improve significantly over the first 2 weeks, but it may take 6 weeks before you are completely recovered. 3. Back pain or general body aches or muscle soreness are expected and should improve with acetominophen or ibuprofen. 4. Leg swelling due to pregnancy and/or IV fluids given in the hospital will take about two weeks to resolve. 5. Most women experience some form of the \"Baby Blues\" after having a baby. Feeling emotional, tearful, frustrated, anxious, sad, and irritable some of the time is normal and go away after about 2 weeks. Adequate rest and help from your family will help. Take breaks from caring for the baby. Call your doctor if your symptoms seem severe, last more than 2 weeks, or seem to be getting worse instead of better. Get help immediately if you have thoughts of wanting to hurt yourself or others! Call your doctor or seek immediate medical care if you have:  Heavy vaginal bleeding, soaking through one or more pads an hour for several hours. Foul-smelling discharge from your vagina or incision.   Consistent nausea and vomiting and cannot keep fluids down. Consistent pain that does not get better after you take pain medicine.   Sudden chest pain and shortness of breath  Signs of a blood clot: pain/ swelling/ increasing redness in your lower extremeties  Signs of infection: increased pain in your abdomen or vaginal area; red streaks, warmth, or tenderness of your breasts; fever of 100.5 F or greater

## 2017-11-26 NOTE — PROGRESS NOTES
Discharge instructions given to patient including but not limited to signs and symptoms and who to call; restrictions and limitations; postpartum depression. All questions answered. Prescriptions given to patient. Discharge paperwork signed in computer.